# Patient Record
(demographics unavailable — no encounter records)

---

## 2017-10-27 NOTE — ULT
THYROID ULTRASOUND:

 

Date:  10/27/17 

 

HISTORY:  

Dominant nodule in right thyroid lobe on recent CT. 

 

COMPARISON:  

None. 

 

FINDINGS:

Thyroid isthmus measures 0.1 cm. 

 

Right thyroid lobe measures 1.8 x 4.7 x 2.1 cm. 

 

Left thyroid lobe measures 1.8 x 1.5 x 4.4 cm. 

 

With regard to the left thyroid lobe, there is mild heterogeneity. There is no evidence of a dominan
t nodule. Calipers based on static images do not have a demonstrable nodule on real-time imaging. An
echoic focus in left thyroid lobe is felt to represent a small incidental cystic lesion. 

 

There is a dominant solid or cystic nodule in the right thyroid lobe measuring 1.8 x 1.8 x 2.1 cm. T
his nodule was deemed appropriate for fine needle aspiration. 

 

At total of four 25 gauge fine needle aspirations of a right thyroid lobe nodule performed. There we
re no immediate postprocedure complications. 

 

TECHNIQUE:  

Consent obtained to perform an ultrasound guided fine needle aspiration of a right thyroid nodule. S
kin was prepped and draped in the sterile fashion. 1% lidocaine, buffered with sodium bicarbonate, w
as used for local anesthesia. Under ultrasound guidance, a total of four 25 gauge fine needle aspira
tions were performed. The patient tolerated the procedure well. No immediate or postprocedure compli
cation. 

 

IMPRESSION: 

Technically successful ultrasound guided fine needle aspiration. 

 

 

POS: SLIME